# Patient Record
(demographics unavailable — no encounter records)

---

## 2025-04-10 NOTE — HISTORY OF PRESENT ILLNESS
[FreeTextEntry1] : CPE htn [de-identified] : 57yo F with pmh significant for hld, htn presents for annual physical. Mammo and pap 2 years ago, normal. No colon cancer screening. Pt expresses a significant amount of stress in regard to the current political and economic climate. She is concerned about her job as well as her quality of life. She feels it is affecting her sleep and her mental health overall. She is concerned for her job.

## 2025-04-10 NOTE — ASSESSMENT
[Vaccines Reviewed] : Immunizations reviewed today. Please see immunization details in the vaccine log within the immunization flowsheet.  [FreeTextEntry1] : Discussed with the patient health care maintenance and age and condition appropriate vaccinations. Further discussed age-appropriate cancer screening testing as indicated including colon cancer screening/colonoscopy, cervical cancer screening/PAP testing, breast cancer screening/mammogram and skin cancer screening. Discussed healthy diet, exercise and weight control for health. Discussed healthy habits including abstaining from smoking and drugs and abstention/moderation with alcohol. Discussed routine regular ophthalmology and dental follow up.

## 2025-05-02 NOTE — HISTORY OF PRESENT ILLNESS
[FreeTextEntry1] : f/u elevated BP reading [de-identified] : 57yo F with pmh significant for htn presents for follow up BP after elevated BP reading last visit. She has been noncompliant with her olmesartan. Otherwise doing well.